# Patient Record
Sex: MALE | Race: BLACK OR AFRICAN AMERICAN | Employment: FULL TIME | URBAN - METROPOLITAN AREA
[De-identification: names, ages, dates, MRNs, and addresses within clinical notes are randomized per-mention and may not be internally consistent; named-entity substitution may affect disease eponyms.]

---

## 2017-02-27 ENCOUNTER — OFFICE VISIT (OUTPATIENT)
Dept: INTERNAL MEDICINE CLINIC | Facility: CLINIC | Age: 57
End: 2017-02-27

## 2017-02-27 VITALS
TEMPERATURE: 98 F | WEIGHT: 215 LBS | RESPIRATION RATE: 15 BRPM | BODY MASS INDEX: 33.35 KG/M2 | HEART RATE: 68 BPM | SYSTOLIC BLOOD PRESSURE: 158 MMHG | OXYGEN SATURATION: 98 % | DIASTOLIC BLOOD PRESSURE: 110 MMHG | HEIGHT: 67.25 IN

## 2017-02-27 DIAGNOSIS — Z00.00 PREVENTATIVE HEALTH CARE: ICD-10-CM

## 2017-02-27 DIAGNOSIS — B35.1 ONYCHOMYCOSIS: Primary | ICD-10-CM

## 2017-02-27 DIAGNOSIS — I10 ESSENTIAL HYPERTENSION: ICD-10-CM

## 2017-02-27 PROCEDURE — 99214 OFFICE O/P EST MOD 30 MIN: CPT | Performed by: INTERNAL MEDICINE

## 2017-02-27 RX ORDER — TERBINAFINE HYDROCHLORIDE 250 MG/1
250 TABLET ORAL DAILY
Qty: 30 TABLET | Refills: 2 | Status: SHIPPED | OUTPATIENT
Start: 2017-02-27 | End: 2017-05-25

## 2017-02-27 NOTE — PROGRESS NOTES
Onel Bui is a 55year old male.      HPI:   Patient presents with:  Fungus Nails: both feet, both great toes (fungus), x1 year   Other: mold in house 2-3 years, anytime pt gets a cut it gets harder to heal   Patient presents with complaint of bilater 33.43 kg/m2  SpO2 98%  GENERAL: Alert and oriented, well developed, well nourished,in no apparent distress  SKIN: onychomycosis of both great toes  HEENT: atraumatic, PERRLA, EOMI, normal lid and conjunctiva  LUNGS: clear to auscultation bilaterally, no wh

## 2017-02-27 NOTE — PATIENT INSTRUCTIONS
For your toenail fungus, we will start medication. I have sent your prescription to [address], [address]  Get your blood work done in six weeks. It was a pleasure seeing you in the clinic today.   Thank you for choosing the [facility]

## 2017-03-31 ENCOUNTER — TELEPHONE (OUTPATIENT)
Dept: INTERNAL MEDICINE CLINIC | Facility: CLINIC | Age: 57
End: 2017-03-31

## 2017-05-25 RX ORDER — TERBINAFINE HYDROCHLORIDE 250 MG/1
TABLET ORAL
Qty: 30 TABLET | Refills: 2 | Status: SHIPPED | OUTPATIENT
Start: 2017-05-25 | End: 2018-06-20 | Stop reason: ALTCHOICE

## 2017-05-26 RX ORDER — TERBINAFINE HYDROCHLORIDE 250 MG/1
TABLET ORAL
Qty: 30 TABLET | Refills: 0 | OUTPATIENT
Start: 2017-05-26

## 2017-05-26 NOTE — TELEPHONE ENCOUNTER
Patient needs to get blood work done first to make sure terbinafine has not affected liver enzymes. If they are normal, I will send in a refill. Orders are in the system along with general fasting labs.

## 2017-09-05 RX ORDER — TERBINAFINE HYDROCHLORIDE 250 MG/1
TABLET ORAL
Qty: 30 TABLET | Refills: 2 | OUTPATIENT
Start: 2017-09-05

## 2017-09-26 RX ORDER — TERBINAFINE HYDROCHLORIDE 250 MG/1
TABLET ORAL
Qty: 30 TABLET | Refills: 2 | OUTPATIENT
Start: 2017-09-26

## 2018-06-20 ENCOUNTER — OFFICE VISIT (OUTPATIENT)
Dept: INTERNAL MEDICINE CLINIC | Facility: CLINIC | Age: 58
End: 2018-06-20

## 2018-06-20 ENCOUNTER — LAB ENCOUNTER (OUTPATIENT)
Dept: LAB | Age: 58
End: 2018-06-20
Attending: INTERNAL MEDICINE
Payer: COMMERCIAL

## 2018-06-20 VITALS
HEART RATE: 70 BPM | SYSTOLIC BLOOD PRESSURE: 158 MMHG | BODY MASS INDEX: 30.54 KG/M2 | HEIGHT: 68 IN | DIASTOLIC BLOOD PRESSURE: 110 MMHG | RESPIRATION RATE: 18 BRPM | OXYGEN SATURATION: 97 % | TEMPERATURE: 99 F | WEIGHT: 201.5 LBS

## 2018-06-20 DIAGNOSIS — E66.9 OBESITY, CLASS I, BMI 30-34.9: ICD-10-CM

## 2018-06-20 DIAGNOSIS — Z00.00 ENCOUNTER FOR PREVENTATIVE ADULT HEALTH CARE EXAMINATION: ICD-10-CM

## 2018-06-20 DIAGNOSIS — I10 ESSENTIAL HYPERTENSION: Primary | ICD-10-CM

## 2018-06-20 DIAGNOSIS — R20.2 PARESTHESIAS IN RIGHT HAND: ICD-10-CM

## 2018-06-20 DIAGNOSIS — B35.1 ONYCHOMYCOSIS: ICD-10-CM

## 2018-06-20 PROCEDURE — 80061 LIPID PANEL: CPT | Performed by: INTERNAL MEDICINE

## 2018-06-20 PROCEDURE — 80050 GENERAL HEALTH PANEL: CPT | Performed by: INTERNAL MEDICINE

## 2018-06-20 PROCEDURE — 83036 HEMOGLOBIN GLYCOSYLATED A1C: CPT | Performed by: INTERNAL MEDICINE

## 2018-06-20 PROCEDURE — 99214 OFFICE O/P EST MOD 30 MIN: CPT | Performed by: INTERNAL MEDICINE

## 2018-06-20 PROCEDURE — 84153 ASSAY OF PSA TOTAL: CPT | Performed by: INTERNAL MEDICINE

## 2018-06-20 PROCEDURE — 82306 VITAMIN D 25 HYDROXY: CPT | Performed by: INTERNAL MEDICINE

## 2018-06-20 PROCEDURE — 36415 COLL VENOUS BLD VENIPUNCTURE: CPT | Performed by: INTERNAL MEDICINE

## 2018-06-20 RX ORDER — LOSARTAN POTASSIUM 50 MG/1
50 TABLET ORAL DAILY
Qty: 30 TABLET | Refills: 1 | Status: SHIPPED | OUTPATIENT
Start: 2018-06-20 | End: 2018-07-13

## 2018-06-20 NOTE — PATIENT INSTRUCTIONS
- We will start a blood pressure medication (losartan). Take daily.    - Goal blood pressure is <140/90.    - Get blood work done when you can. Ideally, we want you to fast for at least 8 hours.   Our lab is open M-F, opens at 7 AM.   - Keep doing the exe

## 2018-06-20 NOTE — PROGRESS NOTES
Kelly Pratt is a 65year old male. HPI:   Patient presents with:  Numbness: Pt c/o numbness on right hand fingers  Toenail Fungus: Follow  up - finished lamisil  Lab: (order in system )  Patient presents with several issues.   He has been deali lb 12 oz  07/27/13 : 195 lb  06/10/13 : 200 lb    EXAM:   BP (!) 158/110 (BP Location: Left arm, Patient Position: Sitting, Cuff Size: adult)   Pulse 70   Temp 98.9 °F (37.2 °C) (Oral)   Resp 18   Ht 68\"   Wt 201 lb 8 oz   SpO2 97%   BMI 30.64 kg/m²   GEN or earlier if acute issues arise.     Linda Pappas MD

## 2018-06-22 ENCOUNTER — TELEPHONE (OUTPATIENT)
Dept: INTERNAL MEDICINE CLINIC | Facility: CLINIC | Age: 58
End: 2018-06-22

## 2018-06-22 DIAGNOSIS — Z00.00 PREVENTATIVE HEALTH CARE: ICD-10-CM

## 2018-06-22 DIAGNOSIS — Z20.2 POSSIBLE EXPOSURE TO STD: Primary | ICD-10-CM

## 2018-06-22 DIAGNOSIS — R79.9 ABNORMAL BLOOD CHEMISTRY: Primary | ICD-10-CM

## 2018-06-22 NOTE — TELEPHONE ENCOUNTER
Ok.  We did not specifically talk about STD testing during his office visit, and it's usually not a part of routine physical labs. STD testing lab orders placed.   Does not have to fast. Metabolic panel should be repeated in 1 month - so if he goes to lab

## 2018-06-22 NOTE — TELEPHONE ENCOUNTER
Pt was under the impression he was having STD labs done. So he is looking to get labs done for STD including HIV please order if appropriate.  Please advise

## 2018-07-09 ENCOUNTER — LAB ENCOUNTER (OUTPATIENT)
Dept: LAB | Age: 58
End: 2018-07-09
Attending: INTERNAL MEDICINE
Payer: COMMERCIAL

## 2018-07-09 DIAGNOSIS — Z20.2 POSSIBLE EXPOSURE TO STD: ICD-10-CM

## 2018-07-09 DIAGNOSIS — Z00.00 PREVENTATIVE HEALTH CARE: ICD-10-CM

## 2018-07-09 LAB — T PALLIDUM AB SER QL IA: REACTIVE

## 2018-07-09 PROCEDURE — 86780 TREPONEMA PALLIDUM: CPT | Performed by: INTERNAL MEDICINE

## 2018-07-09 PROCEDURE — 87389 HIV-1 AG W/HIV-1&-2 AB AG IA: CPT | Performed by: INTERNAL MEDICINE

## 2018-07-09 PROCEDURE — 86592 SYPHILIS TEST NON-TREP QUAL: CPT | Performed by: INTERNAL MEDICINE

## 2018-07-09 PROCEDURE — 36415 COLL VENOUS BLD VENIPUNCTURE: CPT | Performed by: INTERNAL MEDICINE

## 2018-07-10 LAB — RPR SER QL: NONREACTIVE

## 2018-07-13 LAB — TREPONEMA PALLIDUM AB BY TP-PA: REACTIVE

## 2018-07-13 RX ORDER — LOSARTAN POTASSIUM 50 MG/1
50 TABLET ORAL DAILY
Qty: 30 TABLET | Refills: 1 | Status: SHIPPED | OUTPATIENT
Start: 2018-07-13 | End: 2018-10-08

## 2018-07-20 ENCOUNTER — HOSPITAL ENCOUNTER (OUTPATIENT)
Age: 58
Discharge: HOME | End: 2018-07-20
Attending: FAMILY MEDICINE
Payer: COMMERCIAL

## 2018-07-20 VITALS
SYSTOLIC BLOOD PRESSURE: 168 MMHG | OXYGEN SATURATION: 97 % | RESPIRATION RATE: 18 BRPM | HEART RATE: 71 BPM | DIASTOLIC BLOOD PRESSURE: 94 MMHG | TEMPERATURE: 98 F

## 2018-07-20 DIAGNOSIS — Z11.3 ROUTINE SCREENING FOR STI (SEXUALLY TRANSMITTED INFECTION): Primary | ICD-10-CM

## 2018-07-20 DIAGNOSIS — Z86.19 HISTORY OF SYPHILIS: ICD-10-CM

## 2018-07-20 LAB
POCT BILIRUBIN URINE: NEGATIVE
POCT BLOOD URINE: NEGATIVE
POCT GLUCOSE URINE: NEGATIVE MG/DL
POCT KETONE URINE: NEGATIVE MG/DL
POCT LEUKOCYTE ESTERASE URINE: NEGATIVE
POCT NITRITE URINE: NEGATIVE
POCT PH URINE: 6 (ref 5–8)
POCT PROTEIN URINE: NEGATIVE MG/DL
POCT SPECIFIC GRAVITY URINE: 1.01
POCT URINE CLARITY: CLEAR
POCT URINE COLOR: YELLOW
POCT UROBILINOGEN URINE: 1 MG/DL

## 2018-07-20 PROCEDURE — 99212 OFFICE O/P EST SF 10 MIN: CPT

## 2018-07-20 PROCEDURE — 87491 CHLMYD TRACH DNA AMP PROBE: CPT | Performed by: FAMILY MEDICINE

## 2018-07-20 PROCEDURE — 81002 URINALYSIS NONAUTO W/O SCOPE: CPT | Performed by: FAMILY MEDICINE

## 2018-07-20 PROCEDURE — 87591 N.GONORRHOEAE DNA AMP PROB: CPT | Performed by: FAMILY MEDICINE

## 2018-07-20 PROCEDURE — 99202 OFFICE O/P NEW SF 15 MIN: CPT

## 2018-07-20 NOTE — ED INITIAL ASSESSMENT (HPI)
Pt tested positive for syphilis a couple of weeks ago. Needs treatment. Denies any symptoms. Also wants gonorrhea/chlymidia testing.

## 2018-07-20 NOTE — PROGRESS NOTES
Appointment cancelled - patient here for blood pressure follow up, but he never started prescribed medication. Additionally, patient was to get IM Penicillin G at immediate care for syphilis treatment, never did.   His syphilis antibodies are equivocal - p

## 2018-07-20 NOTE — ED PROVIDER NOTES
Patient Seen in: [facility]    History   Patient presents with:  Eval-G (gynecologic)    Stated Complaint: penicillin g shot per dr Beau Mckenna & urinalysis for poss chlamydia exposure    HPI  Patient is a 81-year-old male sent from HPI.  Constitutional and vital signs reviewed. All other systems reviewed and negative except as noted above.     Physical Exam   ED Triage Vitals [07/20/18 0918]  BP: (!) 164/94  Pulse: 71  Resp: 18  Temp: 98.3 °F (36.8 °C)  Temp src: Temporal  SpO2: [address] to obtain those details. If patient was not treatment he will need a series of 3 high Bicillin shots.            Disposition and Plan     Clinical Impression:  Routine screening for STI (sexually transmitted infection)  (primary encounter diagnos

## 2018-07-22 LAB
C TRACH DNA SPEC QL NAA+PROBE: NEGATIVE
N GONORRHOEA DNA SPEC QL NAA+PROBE: NEGATIVE

## 2018-08-20 ENCOUNTER — LAB ENCOUNTER (OUTPATIENT)
Dept: LAB | Age: 58
End: 2018-08-20
Attending: INTERNAL MEDICINE
Payer: COMMERCIAL

## 2018-08-20 DIAGNOSIS — R79.9 ABNORMAL BLOOD CHEMISTRY: ICD-10-CM

## 2018-08-24 ENCOUNTER — TELEPHONE (OUTPATIENT)
Dept: INTERNAL MEDICINE CLINIC | Facility: CLINIC | Age: 58
End: 2018-08-24

## 2018-08-24 NOTE — ED NOTES
Patient called requesting lab test results and to speak to Dr. Mike Galindo. Patients name and date of birth verified. Patient informed of [address] results from 7-. Patient is requesting additional labs that were done in the immediate care.  Patient notifi

## 2018-08-24 NOTE — TELEPHONE ENCOUNTER
Pt called to inquire about test results completed on 7/9/18 and + for syphilis, pt informed did not get treatment and inquire if should come back to our office. Per IC notes no abx IM injection given.

## 2018-08-24 NOTE — TELEPHONE ENCOUNTER
Pt has schedule an ov for 8/30/18 w Dr Angela Harley to go over test results and need to explain to fiance as well.

## 2018-08-30 ENCOUNTER — OFFICE VISIT (OUTPATIENT)
Dept: INTERNAL MEDICINE CLINIC | Facility: CLINIC | Age: 58
End: 2018-08-30
Payer: COMMERCIAL

## 2018-08-30 VITALS
RESPIRATION RATE: 18 BRPM | BODY MASS INDEX: 30.46 KG/M2 | OXYGEN SATURATION: 98 % | DIASTOLIC BLOOD PRESSURE: 90 MMHG | TEMPERATURE: 98 F | HEART RATE: 59 BPM | SYSTOLIC BLOOD PRESSURE: 146 MMHG | HEIGHT: 68 IN | WEIGHT: 201 LBS

## 2018-08-30 DIAGNOSIS — Z86.19 HISTORY OF SYPHILIS: Primary | ICD-10-CM

## 2018-08-30 DIAGNOSIS — I10 ESSENTIAL HYPERTENSION: ICD-10-CM

## 2018-08-30 PROCEDURE — 99214 OFFICE O/P EST MOD 30 MIN: CPT | Performed by: INTERNAL MEDICINE

## 2018-08-30 RX ORDER — TADALAFIL 10 MG/1
10 TABLET ORAL
Qty: 10 TABLET | Refills: 1 | Status: SHIPPED | OUTPATIENT
Start: 2018-08-30 | End: 2018-12-19 | Stop reason: ALTCHOICE

## 2018-08-30 RX ORDER — MELOXICAM 7.5 MG/1
7.5 TABLET ORAL DAILY
Qty: 20 TABLET | Refills: 0 | Status: SHIPPED | OUTPATIENT
Start: 2018-08-30 | End: 2018-09-15

## 2018-08-30 NOTE — PATIENT INSTRUCTIONS
- Continue blood pressure medication  - For the arm pain, ice the area once a day and take prescription anti-inflammatory (meloxicam) once a day for the next few weeks  - Cialis sent to pharmacy  - Follow up with Dr. Xander Kim to discuss test results.     It w

## 2018-09-01 NOTE — PROGRESS NOTES
Quique Cardenas is a 63year old male. HPI:   Patient presents with: Follow - Up: Test Results   Hypertension: Pt states he has been taking his BP medication but is causing him SE he would like to discuss with doctor.    Patient presents to discuss sever history that includes colonoscopy (7/27/13  [facility])) and colonoscopy,biopsy (7/27/2013). Family: family history includes BLOOD CLOT in his brother; Cancer in his father; Diabetes in his mother; Heart Disorder in his mother.   Social:  reports that he i that time was spent coordinating and counseling on the aforementioned medical issues, mainly on discussion of syphilis test results, need to see infectious disease.     Patient Care Team:  Deena Villaseñor MD as PCP - General (Internal Medicine)  The patient in

## 2018-09-17 RX ORDER — MELOXICAM 7.5 MG/1
TABLET ORAL
Qty: 20 TABLET | Refills: 1 | Status: SHIPPED | OUTPATIENT
Start: 2018-09-17 | End: 2018-10-26

## 2018-10-08 RX ORDER — TADALAFIL 10 MG
TABLET ORAL
Qty: 10 TABLET | Refills: 0 | OUTPATIENT
Start: 2018-10-08

## 2018-10-08 RX ORDER — TADALAFIL 20 MG/1
10 TABLET ORAL AS NEEDED
Qty: 10 TABLET | Refills: 3 | Status: SHIPPED | OUTPATIENT
Start: 2018-10-08 | End: 2018-12-19

## 2018-10-08 RX ORDER — LOSARTAN POTASSIUM 50 MG/1
TABLET ORAL
Qty: 30 TABLET | Refills: 0 | Status: SHIPPED | OUTPATIENT
Start: 2018-10-08 | End: 2018-11-05

## 2018-10-08 NOTE — TELEPHONE ENCOUNTER
Cialis 10 mg 1 tab daily prn filled 8-30-18 10 with 1 refill     Losartan 50 mg 1 tab daily filled 7-13-18 30 with 1 refill     LOv 8-30-18     Essential hypertension  Improved, but still slightly above goal.  Focus on diet/exercise for now.   Continue gregory

## 2018-10-29 NOTE — TELEPHONE ENCOUNTER
LMTCB x 1  Is refill being request by pt or automated pharmacy?     Requested Prescriptions     Pending Prescriptions Disp Refills   • MELOXICAM 7.5 MG Oral Tab [Pharmacy Med Name: MELOXICAM 7.5 MG TABLET] 20 tablet 1     Sig: TAKE 1 TABLET BY MOUTH EVERY D

## 2018-10-31 RX ORDER — MELOXICAM 7.5 MG/1
7.5 TABLET ORAL DAILY
Qty: 30 TABLET | Refills: 1 | Status: SHIPPED | OUTPATIENT
Start: 2018-10-31 | End: 2018-12-19 | Stop reason: ALTCHOICE

## 2018-11-05 RX ORDER — LOSARTAN POTASSIUM 50 MG/1
50 TABLET ORAL DAILY
Qty: 30 TABLET | Refills: 0 | Status: SHIPPED | OUTPATIENT
Start: 2018-11-05 | End: 2018-12-19

## 2018-11-05 NOTE — TELEPHONE ENCOUNTER
LMTCB x 1 - Due ofr BP FU    Refill requested:   Requested Prescriptions     Pending Prescriptions Disp Refills   • Losartan Potassium 50 MG Oral Tab [Pharmacy Med Name: LOSARTAN POTASSIUM 50 MG TAB] 90 tablet 0     Sig: Take 1 tablet (50 mg total) by ophelia

## 2018-12-05 RX ORDER — MELOXICAM 7.5 MG/1
7.5 TABLET ORAL DAILY
Qty: 30 TABLET | Refills: 1 | Status: CANCELLED | OUTPATIENT
Start: 2018-12-05

## 2018-12-06 NOTE — TELEPHONE ENCOUNTER
Spoke to pt and states he does not need this medication. Informed medication will be declined to pharmacy. Pt scheduled a follow up appt with provider.      Refill requested:   Requested Prescriptions     Pending Prescriptions Disp Refills   • Meloxicam

## 2018-12-19 ENCOUNTER — OFFICE VISIT (OUTPATIENT)
Dept: INTERNAL MEDICINE CLINIC | Facility: CLINIC | Age: 58
End: 2018-12-19
Payer: COMMERCIAL

## 2018-12-19 VITALS
WEIGHT: 205.5 LBS | HEIGHT: 68 IN | TEMPERATURE: 98 F | BODY MASS INDEX: 31.14 KG/M2 | SYSTOLIC BLOOD PRESSURE: 152 MMHG | HEART RATE: 72 BPM | DIASTOLIC BLOOD PRESSURE: 96 MMHG | RESPIRATION RATE: 16 BRPM

## 2018-12-19 DIAGNOSIS — I10 ESSENTIAL HYPERTENSION: Primary | ICD-10-CM

## 2018-12-19 DIAGNOSIS — L90.5 SCAR IRRITATION: ICD-10-CM

## 2018-12-19 DIAGNOSIS — N52.9 ERECTILE DYSFUNCTION, UNSPECIFIED ERECTILE DYSFUNCTION TYPE: ICD-10-CM

## 2018-12-19 DIAGNOSIS — R53.83 FATIGUE, UNSPECIFIED TYPE: ICD-10-CM

## 2018-12-19 DIAGNOSIS — R09.81 NASAL CONGESTION: ICD-10-CM

## 2018-12-19 DIAGNOSIS — R23.8 EASY BRUISING: ICD-10-CM

## 2018-12-19 DIAGNOSIS — B35.1 ONYCHOMYCOSIS: ICD-10-CM

## 2018-12-19 PROCEDURE — 99214 OFFICE O/P EST MOD 30 MIN: CPT | Performed by: INTERNAL MEDICINE

## 2018-12-19 RX ORDER — TADALAFIL 20 MG/1
10 TABLET ORAL AS NEEDED
Qty: 10 TABLET | Refills: 5 | Status: SHIPPED | OUTPATIENT
Start: 2018-12-19 | End: 2019-11-16

## 2018-12-19 RX ORDER — LOSARTAN POTASSIUM 50 MG/1
50 TABLET ORAL DAILY
Qty: 90 TABLET | Refills: 1 | Status: SHIPPED | OUTPATIENT
Start: 2018-12-19 | End: 2019-06-26

## 2018-12-19 NOTE — PROGRESS NOTES
Ryan Hanley is a 63year old male. HPI:   Patient presents with:  HTN: Follow up - states he has been \"pretty much\" taking his BP medication daily  Patient presents for follow up on several issues.     Hypertension - above goal.  He states he takes Last 6 Encounters:  12/19/18 : 205 lb 8 oz  08/30/18 : 201 lb  07/20/18 : 203 lb  06/20/18 : 201 lb 8 oz  02/27/17 : 215 lb  11/24/15 : 202 lb 12 oz    EXAM:   BP (!) 152/96 (BP Location: Left arm, Patient Position: Sitting, Cuff Size: adult)   Pulse 72 few months. Easy bruising and scarring - long-lasting scars from minimal trauma. Unclear etiology. Will check labs as below. Will also have patient see Dermatology.   - KRISTIE, DIRECT, REFLEX TO 9 ENAS; Future  - PROTHROMBIN TIME (PT); Future  - PTT, ACTIV

## 2018-12-20 NOTE — PATIENT INSTRUCTIONS
- Get blood work done (do not have to fast)  - Follow up with Dermatology for your scarring/easy wounds  - Take blood pressure medication every day! Do not miss a dose!  - Follow up in clinic in 3 months.     It was a pleasure seeing you in the clinic toda

## 2018-12-22 PROBLEM — N52.9 ERECTILE DYSFUNCTION: Status: ACTIVE | Noted: 2018-12-22

## 2019-06-26 DIAGNOSIS — I10 ESSENTIAL HYPERTENSION: ICD-10-CM

## 2019-06-26 NOTE — TELEPHONE ENCOUNTER
LMTCB x 1 - Due for OV     Failed protocol     Last refill:  12/19/2018 Losartan 50 mg #90 1R    LOV:   12/19/2018 Dr Benjamin Valencia RTC 3 months   1.  Essential hypertension  Above goal.  Compliance with medication is intermittent at best.  Will continue losartan

## 2019-06-27 RX ORDER — LOSARTAN POTASSIUM 50 MG/1
TABLET ORAL
Qty: 30 TABLET | Refills: 0 | Status: SHIPPED | OUTPATIENT
Start: 2019-06-27

## 2019-11-16 DIAGNOSIS — N52.9 ERECTILE DYSFUNCTION, UNSPECIFIED ERECTILE DYSFUNCTION TYPE: ICD-10-CM

## 2019-11-18 RX ORDER — TADALAFIL 20 MG/1
10 TABLET ORAL AS NEEDED
Qty: 8 TABLET | Refills: 7 | Status: SHIPPED | OUTPATIENT
Start: 2019-11-18 | End: 2020-11-18

## 2019-11-18 NOTE — TELEPHONE ENCOUNTER
TADALAFIL 20 MG Oral Tab    Last OV relevant to medication: 12/19/2019  Last refill date: 12/19/2018   #/refills: #10 w/ 5 refills   When pt was asked to return for OV: 3 months   Upcoming appt/reason: No future appointments

## 2020-11-06 DIAGNOSIS — N52.9 ERECTILE DYSFUNCTION, UNSPECIFIED ERECTILE DYSFUNCTION TYPE: ICD-10-CM

## 2020-11-09 NOTE — TELEPHONE ENCOUNTER
Failed protocol     Last refill:  11/18/2019 Tadalafil 20 mg #8 7R    LOV:   12/19/2018 Dr Simpson [place] RTC 3 months  No FOV scheduled     Pt is due for OV with our office  Please contact pt to schedule.

## 2020-11-17 NOTE — TELEPHONE ENCOUNTER
Tried calling patient. Unable to leave VM, mailbox full.  MyChart not actice  Unable to contact patient

## 2020-11-18 RX ORDER — TADALAFIL 20 MG/1
10 TABLET ORAL AS NEEDED
Qty: 8 TABLET | Refills: 0 | Status: SHIPPED | OUTPATIENT
Start: 2020-11-18

## 2021-02-10 DIAGNOSIS — N52.9 ERECTILE DYSFUNCTION, UNSPECIFIED ERECTILE DYSFUNCTION TYPE: ICD-10-CM

## 2021-02-10 RX ORDER — TADALAFIL 20 MG/1
TABLET ORAL
Qty: 8 TABLET | Refills: 0 | OUTPATIENT
Start: 2021-02-10

## 2021-02-10 NOTE — TELEPHONE ENCOUNTER
Due for OV>     Failed protocol     Last refill:  11/18/2020 Tadalafil 20 mg #8 tablet NR    LOV:   12/19/2018 Dr Barbra Flynn - No FOV scheduled

## 2021-07-20 DIAGNOSIS — N52.9 ERECTILE DYSFUNCTION, UNSPECIFIED ERECTILE DYSFUNCTION TYPE: ICD-10-CM

## 2021-07-21 RX ORDER — TADALAFIL 20 MG/1
TABLET ORAL
Qty: 8 TABLET | Refills: 0 | OUTPATIENT
Start: 2021-07-21

## 2021-07-21 NOTE — TELEPHONE ENCOUNTER
Pt needs to re-establish care with provider.      Tadalafil 20 mg  Filled 11-18-20  Qty 8  0 refills

## 2022-01-21 ENCOUNTER — TELEPHONE (OUTPATIENT)
Dept: INTERNAL MEDICINE CLINIC | Facility: CLINIC | Age: 62
End: 2022-01-21

## 2024-07-09 ENCOUNTER — APPOINTMENT (OUTPATIENT)
Dept: GENERAL RADIOLOGY | Facility: HOSPITAL | Age: 64
End: 2024-07-09
Payer: COMMERCIAL

## 2024-07-09 ENCOUNTER — APPOINTMENT (OUTPATIENT)
Dept: CT IMAGING | Facility: HOSPITAL | Age: 64
End: 2024-07-09
Attending: EMERGENCY MEDICINE
Payer: COMMERCIAL

## 2024-07-09 ENCOUNTER — HOSPITAL ENCOUNTER (EMERGENCY)
Facility: HOSPITAL | Age: 64
Discharge: HOME | End: 2024-07-09
Attending: EMERGENCY MEDICINE
Payer: COMMERCIAL

## 2024-07-09 VITALS
BODY MASS INDEX: 33 KG/M2 | DIASTOLIC BLOOD PRESSURE: 97 MMHG | SYSTOLIC BLOOD PRESSURE: 171 MMHG | TEMPERATURE: 98 F | HEART RATE: 75 BPM | OXYGEN SATURATION: 99 % | WEIGHT: 220 LBS | RESPIRATION RATE: 18 BRPM

## 2024-07-09 DIAGNOSIS — M62.838 MUSCLE SPASMS OF NECK: Primary | ICD-10-CM

## 2024-07-09 DIAGNOSIS — R03.0 ELEVATED BLOOD PRESSURE READING: ICD-10-CM

## 2024-07-09 DIAGNOSIS — R13.10 DYSPHAGIA, UNSPECIFIED TYPE: ICD-10-CM

## 2024-07-09 LAB
ALBUMIN SERPL-MCNC: 3.6 G/DL (ref 3.4–5)
ALBUMIN/GLOB SERPL: 0.7 {RATIO} (ref 1–2)
ALP LIVER SERPL-CCNC: 100 U/L
ALT SERPL-CCNC: 47 U/L
ANION GAP SERPL CALC-SCNC: 5 MMOL/L (ref 0–18)
AST SERPL-CCNC: 40 U/L (ref 15–37)
BASOPHILS # BLD AUTO: 0.01 X10(3) UL (ref 0–0.2)
BASOPHILS NFR BLD AUTO: 0.2 %
BILIRUB SERPL-MCNC: 0.6 MG/DL (ref 0.1–2)
BUN BLD-MCNC: 12 MG/DL (ref 9–23)
CALCIUM BLD-MCNC: 8.8 MG/DL (ref 8.5–10.1)
CHLORIDE SERPL-SCNC: 107 MMOL/L (ref 98–112)
CO2 SERPL-SCNC: 26 MMOL/L (ref 21–32)
CREAT BLD-MCNC: 1.46 MG/DL
EGFRCR SERPLBLD CKD-EPI 2021: 54 ML/MIN/1.73M2 (ref 60–?)
EOSINOPHIL # BLD AUTO: 0.1 X10(3) UL (ref 0–0.7)
EOSINOPHIL NFR BLD AUTO: 2.3 %
ERYTHROCYTE [DISTWIDTH] IN BLOOD BY AUTOMATED COUNT: 13.5 %
GLOBULIN PLAS-MCNC: 4.9 G/DL (ref 2.8–4.4)
GLUCOSE BLD-MCNC: 90 MG/DL (ref 70–99)
HCT VFR BLD AUTO: 44.2 %
HGB BLD-MCNC: 15.3 G/DL
IMM GRANULOCYTES # BLD AUTO: 0.01 X10(3) UL (ref 0–1)
IMM GRANULOCYTES NFR BLD: 0.2 %
LYMPHOCYTES # BLD AUTO: 1.41 X10(3) UL (ref 1–4)
LYMPHOCYTES NFR BLD AUTO: 32.1 %
MCH RBC QN AUTO: 28.3 PG (ref 26–34)
MCHC RBC AUTO-ENTMCNC: 34.6 G/DL (ref 31–37)
MCV RBC AUTO: 81.7 FL
MONOCYTES # BLD AUTO: 0.51 X10(3) UL (ref 0.1–1)
MONOCYTES NFR BLD AUTO: 11.6 %
NEUTROPHILS # BLD AUTO: 2.35 X10 (3) UL (ref 1.5–7.7)
NEUTROPHILS # BLD AUTO: 2.35 X10(3) UL (ref 1.5–7.7)
NEUTROPHILS NFR BLD AUTO: 53.6 %
OSMOLALITY SERPL CALC.SUM OF ELEC: 285 MOSM/KG (ref 275–295)
PLATELET # BLD AUTO: 208 10(3)UL (ref 150–450)
POTASSIUM SERPL-SCNC: 4.5 MMOL/L (ref 3.5–5.1)
PROT SERPL-MCNC: 8.5 G/DL (ref 6.4–8.2)
RBC # BLD AUTO: 5.41 X10(6)UL
SODIUM SERPL-SCNC: 138 MMOL/L (ref 136–145)
TROPONIN I SERPL HS-MCNC: 4 NG/L
WBC # BLD AUTO: 4.4 X10(3) UL (ref 4–11)

## 2024-07-09 PROCEDURE — 96374 THER/PROPH/DIAG INJ IV PUSH: CPT

## 2024-07-09 PROCEDURE — 99285 EMERGENCY DEPT VISIT HI MDM: CPT

## 2024-07-09 PROCEDURE — 70498 CT ANGIOGRAPHY NECK: CPT | Performed by: EMERGENCY MEDICINE

## 2024-07-09 PROCEDURE — 80053 COMPREHEN METABOLIC PANEL: CPT | Performed by: EMERGENCY MEDICINE

## 2024-07-09 PROCEDURE — 85025 COMPLETE CBC W/AUTO DIFF WBC: CPT | Performed by: EMERGENCY MEDICINE

## 2024-07-09 PROCEDURE — 70496 CT ANGIOGRAPHY HEAD: CPT | Performed by: EMERGENCY MEDICINE

## 2024-07-09 PROCEDURE — 72100 X-RAY EXAM L-S SPINE 2/3 VWS: CPT

## 2024-07-09 PROCEDURE — 96361 HYDRATE IV INFUSION ADD-ON: CPT

## 2024-07-09 PROCEDURE — 93010 ELECTROCARDIOGRAM REPORT: CPT

## 2024-07-09 PROCEDURE — 93005 ELECTROCARDIOGRAM TRACING: CPT

## 2024-07-09 PROCEDURE — 72050 X-RAY EXAM NECK SPINE 4/5VWS: CPT

## 2024-07-09 PROCEDURE — 96375 TX/PRO/DX INJ NEW DRUG ADDON: CPT

## 2024-07-09 PROCEDURE — 84484 ASSAY OF TROPONIN QUANT: CPT | Performed by: EMERGENCY MEDICINE

## 2024-07-09 RX ORDER — PREDNISONE 20 MG/1
40 TABLET ORAL DAILY
Qty: 8 TABLET | Refills: 0 | Status: SHIPPED | OUTPATIENT
Start: 2024-07-09 | End: 2024-07-13

## 2024-07-09 RX ORDER — DIAZEPAM 5 MG/1
5 TABLET ORAL ONCE
Status: COMPLETED | OUTPATIENT
Start: 2024-07-09 | End: 2024-07-09

## 2024-07-09 RX ORDER — MORPHINE SULFATE 4 MG/ML
4 INJECTION, SOLUTION INTRAMUSCULAR; INTRAVENOUS ONCE
Status: COMPLETED | OUTPATIENT
Start: 2024-07-09 | End: 2024-07-09

## 2024-07-09 RX ORDER — DIAZEPAM 2 MG/1
2 TABLET ORAL 3 TIMES DAILY PRN
Qty: 20 TABLET | Refills: 0 | Status: SHIPPED | OUTPATIENT
Start: 2024-07-09 | End: 2024-07-16

## 2024-07-09 RX ORDER — DEXAMETHASONE SODIUM PHOSPHATE 10 MG/ML
10 INJECTION, SOLUTION INTRAMUSCULAR; INTRAVENOUS ONCE
Status: COMPLETED | OUTPATIENT
Start: 2024-07-09 | End: 2024-07-09

## 2024-07-09 RX ORDER — IBUPROFEN 600 MG/1
600 TABLET ORAL EVERY 8 HOURS PRN
Qty: 30 TABLET | Refills: 0 | Status: SHIPPED | OUTPATIENT
Start: 2024-07-09 | End: 2024-07-16

## 2024-07-09 RX ORDER — HYDROCODONE BITARTRATE AND ACETAMINOPHEN 5; 325 MG/1; MG/1
1 TABLET ORAL EVERY 8 HOURS PRN
Qty: 10 TABLET | Refills: 0 | Status: SHIPPED | OUTPATIENT
Start: 2024-07-09 | End: 2024-07-15

## 2024-07-09 NOTE — ED INITIAL ASSESSMENT (HPI)
Patient endorses chronic neck pain x 2 months, headache. Pain with sneezing, walking, bending. Worsening neck pain started 2-3 days denies any falls or injuries. Pain 8/10 currently took 4 - 325mg tylenol this morning and aleve 2 tablets around 10:00am some intermittent relief. No blood thinners. Pain is constant and worse with movement.

## 2024-07-10 LAB
ATRIAL RATE: 63 BPM
P AXIS: 65 DEGREES
P-R INTERVAL: 120 MS
Q-T INTERVAL: 412 MS
QRS DURATION: 80 MS
QTC CALCULATION (BEZET): 421 MS
R AXIS: 67 DEGREES
T AXIS: 9 DEGREES
VENTRICULAR RATE: 63 BPM

## 2024-07-10 NOTE — ED PROVIDER NOTES
Patient Seen in: [facility] Emergency Department      History     Chief Complaint   Patient presents with    Pain     Stated Complaint: pain    Subjective:   64-year-old male, history hypertension, presents with neck pain.  Patient states that he had neck pain on and off for years but more persistent last 2 days.  No falls or trauma.  No midline pain.  It is bilateral goes into his scalp.  Causing a headache.  Hurts in his neck when he swallows as well.  No hemoptysis.  No difficulty swallowing other than having the pain.  He is tolerating secretions well.  No swelling.  No erythema.  No rash.  States the pain is now rating into his back as well.            Objective:   Past Medical History:    Essential hypertension              Past Surgical History:   Procedure Laterality Date    Colonoscopy  7/27/13  [facility] ([facility])    Screening: small polyp (adenoma), hemorrhoids; next in 5 yrs    Colonoscopy,biopsy  7/27/2013    Procedure: COLONOSCOPY, POSSIBLE BIOPSY, POSSIBLE POLYPECTOMY 46618;  Surgeon: Nic Miller MD;  Location: [facility]                Social History     Socioeconomic History    Marital status: Single   Tobacco Use    Smoking status: Passive Smoke Exposure - Never Smoker    Smokeless tobacco: Never   Vaping Use    Vaping status: Never Used   Substance and Sexual Activity    Alcohol use: Not Currently     Comment: Socially     Drug use: No   Other Topics Concern    Caffeine Concern Yes     Comment: redbull (1 per day)     Exercise No              Review of Systems   Constitutional:  Negative for fever.   Eyes:  Negative for photophobia and visual disturbance.   Respiratory:  Negative for shortness of breath.    Cardiovascular:  Negative for chest pain.   Gastrointestinal:  Negative for abdominal pain.   Musculoskeletal:  Positive for back pain, neck pain and neck stiffness.   Skin:  Negative for rash.   Neurological:  Positive for headaches. Negative for dizziness, syncope, facial  asymmetry, speech difficulty, weakness and numbness.   Hematological:  Does not bruise/bleed easily.       Positive for stated Chief Complaint: Pain    Other systems are as noted in HPI.  Constitutional and vital signs reviewed.      All other systems reviewed and negative except as noted above.    Physical Exam     ED Triage Vitals [07/09/24 1522]   /90   Pulse 70   Resp 18   Temp 98.2 °F (36.8 °C)   Temp src Oral   SpO2 94 %   O2 Device None (Room air)       Current Vitals:   Vital Signs  BP: (!) 170/102  Pulse: 62  Resp: 19  Temp: 98.2 °F (36.8 °C)  Temp src: Oral    Oxygen Therapy  SpO2: 97 %  O2 Device: None (Room air)            Physical Exam  Vitals and nursing note reviewed.   Constitutional:       General: He is not in acute distress.     Appearance: He is not ill-appearing, toxic-appearing or diaphoretic.   HENT:      Head: Normocephalic and atraumatic.      Nose: Nose normal.      Mouth/Throat:      Mouth: Mucous membranes are moist.   Eyes:      Extraocular Movements: Extraocular movements intact.      Pupils: Pupils are equal, round, and reactive to light.   Cardiovascular:      Rate and Rhythm: Normal rate.      Pulses: Normal pulses.      Heart sounds: Normal heart sounds.   Pulmonary:      Effort: Pulmonary effort is normal. No respiratory distress.      Breath sounds: Normal breath sounds.   Abdominal:      General: There is no distension.      Palpations: Abdomen is soft.      Tenderness: There is no abdominal tenderness.   Musculoskeletal:         General: Normal range of motion.      Cervical back: Neck supple. Tenderness present.   Skin:     General: Skin is warm and dry.   Neurological:      General: No focal deficit present.      Mental Status: He is alert and oriented to person, place, and time.      Cranial Nerves: No cranial nerve deficit.   Psychiatric:         Mood and Affect: Mood normal.         Behavior: Behavior normal.         No carotid bruit.  She has tenderness to the  bilateral cervical paraspinal musculature.  No point midline pain or step-offs.  Pain in thoracic musculature as well as paraspinal muscles.  He has full range of motion of the neck.  No swelling.  No rashes.  Posterior pharynx is clear.  Speech is clear.  No dyspnea secretions.  No cranial nerve deficits.  Lungs are clear, pulse are equal    ED Course     Labs Reviewed   COMP METABOLIC PANEL (14) - Abnormal; Notable for the following components:       Result Value    Creatinine 1.46 (*)     eGFR-Cr 54 (*)     AST 40 (*)     Total Protein 8.5 (*)     Globulin  4.9 (*)     A/G Ratio 0.7 (*)     All other components within normal limits   TROPONIN I HIGH SENSITIVITY - Normal   CBC WITH DIFFERENTIAL WITH PLATELET    Narrative:     The following orders were created for panel order CBC With Differential With Platelet.  Procedure                               Abnormality         Status                     ---------                               -----------         ------                     CBC W/ DIFFERENTIAL[[id number]]                              Final result                 Please view results for these tests on the individual orders.   RAINBOW DRAW BLUE   CBC W/ DIFFERENTIAL     EKG    Rate, intervals and axes as noted on EKG Report.  Rate: 63  Rhythm: Sinus Rhythm  Reading: EKG sinus rhythm 63 bpm.  Normal axis.  No ST elevations.  Abnormal ST segment with school being almost diffusely but no elevation and no reciprocal changes.  No previous EKGs to be to compare to    Patient placed on cardiac monitor for telemetry monitoring secondary to neck pain. Interpretation at bedside by me is sinus rhythm.                            MDM      CTA BRAIN + CTA CAROTIDS (CPT=70496/24000)    Result Date: 7/9/2024  CONCLUSION:  1. No acute intracranial pathology. 2. No abnormality demonstrated in CT angiography of the brain and neck.   LOCATION:  [facility]   Dictated by (CST): Robert Alvarado MD on 7/09/2024 at 10:12 PM     Finalized  by (CST): Robert Alvarado MD on 7/09/2024 at 10:22 PM       XR LUMBAR SPINE (MIN 2 VIEWS) (CPT=72100)    Result Date: 7/9/2024  CONCLUSION:  L3-4, L4-5 and L5-S1 degenerative disc disease.   LOCATION:  [facility]   Dictated by (CST): Robert Alvarado MD on 7/09/2024 at 4:50 PM     Finalized by (CST): Robert Alvarado MD on 7/09/2024 at 4:50 PM       XR CERVICAL SPINE (4VIEWS) (CPT=72050)    Result Date: 7/9/2024  CONCLUSION:  Degenerative disc disease at C3-4, C5-6 and C6-7 with mild bilateral foraminal narrowing at C5-6 and C6-7.   LOCATION:  [facility]   Dictated by (CST): Robert Alvarado MD on 7/09/2024 at 4:48 PM     Finalized by (CST): Robert Alvarado MD on 7/09/2024 at 4:49 PM        I independent interpreted CT the brain without any obvious signs of acute hemorrhage    Differential diagnosis includes but limited to, muscle spasm, aneurysm, dissection, ICH, migraine, dysphagia    Wife at bedside helpful to provide information history present illness    External chart review demonstrates telephone card with his PCP his recent history    64-year-old male presents with some dysphagia as well as some muscle soreness causing a headache.  Neuro intact.  No carotid bruit.  CT of the head and neck are negative for acute pathology.  Unclear etiology of dysphagia.  Sinus issues.  Feels much better no longer having dysphagia with improvement of his neck stiffness and pain.  He has no fever or signs of infection.  No meningismus.  All reproducible to palpation.  Well-appearing nontoxic with normal neurologic examination.  Elevated blood pressure upon arrival secondary to pain which is improved now but will monitor as an outpatient.  Will follow-up with specialist as outpatient with spine and ENT as needed.  Shared decision made utilized and return precaution provided.  NIH is 0.  No neurodeficits    Patient was screened and evaluated during this visit.  As the treating physician attending to the patient, I determined within reasonable  clinical confidence and prior to discharge, that an emergency medical condition was not or was no longer present.  There was no indication for further evaluation, treatment, or admission on an emergency basis.  Comprehensive verbal and written discharge and follow-up instructions were provided to help prevent relapse or worsening.  Patient was instructed to follow-up with their primary care provider for further evaluation and treatment, return immediately to ER for worsening, concerning, new, or changing/persisting symptoms. I discussed the case with the patient and they had no questions, complaints, or concerns.  Patient was comfortable going home.     Per the discharge paperwork, patients are encouraged to and given instructions on how to sign up for MyChart, where they have access to their records, including any/all incidental findings.     This note was prepared using Dragon Medical voice recognition dictation software. As a result errors may occur. When identified these errors have been corrected. While every attempt is made to correct errors during dictation discrepancies may still exist    Note to patient: The 21st Century Cures Act makes medical notes like these available to patients in the interest of transparency. However, this is a medical document intended as peer to peer communication. It is written in medical language and may contain abbreviations or verbiage that are unfamiliar. It may appear blunt or direct. Medical documents are intended to carry relevant information, facts as evident, and the clinical opinion of the practitioner.                                        Medical Decision Making      Disposition and Plan     Clinical Impression:  1. Muscle spasms of neck    2. Dysphagia, unspecified type    3. Elevated blood pressure reading         Disposition:  Discharge  7/9/2024 10:34 pm    Follow-up:  Destiny Terry  [address] [facility] [address] [phone]    Follow up      Lara  MD Clyde  [address]  [address]  [facility] [address]  [phone]    Follow up      Cindy Maddox MD  [address] [facility]   [facility] [address]  [phone]    Follow up            Medications Prescribed:  Current Discharge Medication List        START taking these medications    Details   diazePAM 2 MG Oral Tab Take 1 tablet (2 mg total) by mouth 3 (three) times daily as needed for Anxiety.  Qty: 20 tablet, Refills: 0    Associated Diagnoses: Muscle spasms of neck      ibuprofen 600 MG Oral Tab Take 1 tablet (600 mg total) by mouth every 8 (eight) hours as needed for Pain or Fever.  Qty: 30 tablet, Refills: 0      predniSONE 20 MG Oral Tab Take 2 tablets (40 mg total) by mouth daily for 4 days.  Qty: 8 tablet, Refills: 0      HYDROcodone-acetaminophen 5-325 MG Oral Tab Take 1 tablet by mouth every 8 (eight) hours as needed for Pain.  Qty: 10 tablet, Refills: 0    Associated Diagnoses: Muscle spasms of neck

## (undated) NOTE — LETTER
01/18/19        Prasad Barraza [address] [facility]      Dear Zia Valles,    [facility] records indicate that you have outstanding lab work and or testing that was ordered for you and has not yet been completed:  Orders Placed This Encounter

## (undated) NOTE — LETTER
12/19/18        Amy Huntley  [address]  [facility] [address]      Dear Reyes Redland,    [address] [facility] records indicate that you have outstanding lab work and or testing that was ordered for you and has not yet been completed:     To provide you with the best pos

## (undated) NOTE — MR AVS SNAPSHOT
[place]  [address] [facility] [address]  [address] [id number] [phone]               Thank you for choosing us for your health care visit with Ramesh Arellano MD.  We are glad to serve you and happy to provide you with this s [employer] will allow you to access patient instructions from your recent visit,  view other health information, and more. To sign up or find more information, go to [email or web address] and click on the Sign Up Now link in the [employer] box.      Enter Don’t eat while when you’re bored.      EAT THESE FOODS MORE OFTEN: EAT THESE FOODS LESS OFTEN:   Make half your plate fruits and vegetables Highly refined, white starches including white bread, rice and pasta   Eat plenty of protein, keep the fat content l